# Patient Record
Sex: FEMALE | Race: BLACK OR AFRICAN AMERICAN | NOT HISPANIC OR LATINO | Employment: FULL TIME | URBAN - METROPOLITAN AREA
[De-identification: names, ages, dates, MRNs, and addresses within clinical notes are randomized per-mention and may not be internally consistent; named-entity substitution may affect disease eponyms.]

---

## 2024-01-27 ENCOUNTER — HOSPITAL ENCOUNTER (EMERGENCY)
Facility: HOSPITAL | Age: 49
Discharge: HOME | End: 2024-01-27
Attending: EMERGENCY MEDICINE

## 2024-01-27 VITALS
RESPIRATION RATE: 18 BRPM | OXYGEN SATURATION: 98 % | BODY MASS INDEX: 43.76 KG/M2 | DIASTOLIC BLOOD PRESSURE: 93 MMHG | TEMPERATURE: 99 F | SYSTOLIC BLOOD PRESSURE: 152 MMHG | HEART RATE: 98 BPM | WEIGHT: 254.94 LBS

## 2024-01-27 DIAGNOSIS — L03.115 CELLULITIS OF RIGHT LOWER EXTREMITY: Primary | ICD-10-CM

## 2024-01-27 DIAGNOSIS — I10 HYPERTENSION, UNSPECIFIED TYPE: ICD-10-CM

## 2024-01-27 PROCEDURE — 99284 EMERGENCY DEPT VISIT MOD MDM: CPT

## 2024-01-27 PROCEDURE — 25000003 PHARM REV CODE 250: Performed by: EMERGENCY MEDICINE

## 2024-01-27 RX ORDER — NAPROXEN 250 MG/1
500 TABLET ORAL
Status: COMPLETED | OUTPATIENT
Start: 2024-01-27 | End: 2024-01-27

## 2024-01-27 RX ORDER — CEPHALEXIN 500 MG/1
500 CAPSULE ORAL 4 TIMES DAILY
Qty: 20 CAPSULE | Refills: 0 | Status: SHIPPED | OUTPATIENT
Start: 2024-01-27 | End: 2024-02-06

## 2024-01-27 RX ORDER — NAPROXEN 500 MG/1
500 TABLET ORAL 2 TIMES DAILY
Qty: 20 TABLET | Refills: 0 | Status: SHIPPED | OUTPATIENT
Start: 2024-01-27

## 2024-01-27 RX ORDER — CEPHALEXIN 500 MG/1
500 CAPSULE ORAL
Status: COMPLETED | OUTPATIENT
Start: 2024-01-27 | End: 2024-01-27

## 2024-01-27 RX ADMIN — CEPHALEXIN 500 MG: 500 CAPSULE ORAL at 05:01

## 2024-01-27 RX ADMIN — NAPROXEN 500 MG: 250 TABLET ORAL at 05:01

## 2024-01-27 NOTE — DISCHARGE INSTRUCTIONS
As discussed with you, you blood pressure was high today.  It is needs to be rechecked within the next week.  If blood pressure remains high you may need to start medication.  Please minimize your salt intake and do regular exercise and lose weight that will help decrease your blood pressure.

## 2024-01-27 NOTE — ED PROVIDER NOTES
CERTIFICATE OF WORK    1/8/2019      Re: Nela Montana           [facility] [id number]      This is to certify that Nela Montana has been under my care from 1/8/2019 and can return to regular work on 1/9/19    RESTRICTIONS: None            Please feel free to contact my office with any questions or concerns at the number listed above.        SIGNATURE:___________________________________________     Nixon Green MD        [facility]  [address] [place]  [address] [phone]         Encounter Date: 2024       History     Chief Complaint   Patient presents with    Insect Bite     PT TO ER WITH C/O RIGHT THIRD TOE BUMP AND PAIN THAT RADIATES UP HER RIGHT LEG. PT STATES THAT SHE THINKS SOMETHING BIT HER     48-year-old female with no significant past medical history, came in complaining of pain in the right 3rd toe extending onto the right foot for the last 3 days.    Patient denies any trauma,  says 1st day there was some pus came out on top of the right 3rd toe.  Patient denies any fever or chills.  Patient denies any nausea or vomiting.        Review of patient's allergies indicates:  No Known Allergies  History reviewed. No pertinent past medical history.  Past Surgical History:   Procedure Laterality Date     SECTION, CLASSIC      X 2      No family history on file.  Social History     Tobacco Use    Smoking status: Never   Substance Use Topics    Alcohol use: No    Drug use: No     Review of Systems   Constitutional: Negative.    HENT: Negative.     Eyes: Negative.    Respiratory: Negative.     Cardiovascular: Negative.    Gastrointestinal: Negative.    Endocrine: Negative.    Genitourinary: Negative.    Musculoskeletal:         Right 3rd toe pain, right foot pain,   Allergic/Immunologic: Negative.    Neurological: Negative.    Hematological: Negative.        Physical Exam     Initial Vitals [24 0506]   BP Pulse Resp Temp SpO2   (!) 170/91 102 18 98.5 °F (36.9 °C) 99 %      MAP       --         Physical Exam    Constitutional: She appears well-developed and well-nourished.   HENT:   Head: Normocephalic and atraumatic.   Eyes: EOM are normal. Pupils are equal, round, and reactive to light.   Neck: Neck supple.   Normal range of motion.  Cardiovascular:  Normal rate, regular rhythm and normal heart sounds.           Pulmonary/Chest: No respiratory distress. She has no wheezes. She has no rhonchi. She has no rales.   Abdominal: Abdomen is soft. She exhibits no  distension. There is no abdominal tenderness. There is no guarding.   Musculoskeletal:      Cervical back: Normal range of motion and neck supple.      Comments: Right foot:  Positive tenderness and swelling on the dorsal aspect of the right 3rd toe extending onto the dorsum of the right foot, positive erythema, positive warmth there is localized tenderness at the root of the hair on the dorsal aspect of the right 3rd toe.           ED Course   Procedures  Labs Reviewed - No data to display       Imaging Results    None          Medications   cephALEXin capsule 500 mg (500 mg Oral Given 1/27/24 0518)   naproxen tablet 500 mg (500 mg Oral Given 1/27/24 0518)     Medical Decision Making  Patient's symptoms likely secondary to ingrown hair causing infection leading to cellulitis on the dorsal aspect of the foot.    Discussed this with patient and her  will start patient on Keflex and naproxen.  Patient will follow up with PCP.  Patient aware if any worsening symptoms she will return to ER promptly.    Patient's blood pressure on arrival was 170/91, repeat is now 150/93.  I discussed this with patient and  and she will follow up with PCP to get her blood pressure rechecked she is aware if the blood pressure remains high she may need will start medication.  She will watch her salt intake and try to do regular exercise and lose weight.    Risk  Prescription drug management.                                      Clinical Impression:  Final diagnoses:  [L03.115] Cellulitis of right lower extremity (Primary)  [I10] Hypertension, unspecified type          ED Disposition Condition    Discharge Stable          ED Prescriptions       Medication Sig Dispense Start Date End Date Auth. Provider    cephALEXin (KEFLEX) 500 MG capsule Take 1 capsule (500 mg total) by mouth 4 (four) times daily. for 10 days 20 capsule 1/27/2024 2/6/2024 Jyoti Rivas MD    naproxen (NAPROSYN) 500 MG tablet Take 1 tablet (500 mg  total) by mouth 2 (two) times daily. 20 tablet 1/27/2024 -- Jyoti Rivas MD          Follow-up Information    None          Jyoti Rivas MD  01/27/24 0588

## 2024-01-27 NOTE — Clinical Note
"Phoebe Ramirez" French was seen and treated in our emergency department on 1/27/2024.  She may return to work on 01/29/2024.       If you have any questions or concerns, please don't hesitate to call.      Gonzalo Vu, RN RN    "